# Patient Record
Sex: FEMALE | Race: ASIAN | Employment: OTHER | URBAN - METROPOLITAN AREA
[De-identification: names, ages, dates, MRNs, and addresses within clinical notes are randomized per-mention and may not be internally consistent; named-entity substitution may affect disease eponyms.]

---

## 2017-01-05 ENCOUNTER — OFFICE VISIT (OUTPATIENT)
Dept: INTERNAL MEDICINE CLINIC | Facility: CLINIC | Age: 77
End: 2017-01-05

## 2017-01-05 VITALS
HEART RATE: 80 BPM | WEIGHT: 168 LBS | OXYGEN SATURATION: 98 % | DIASTOLIC BLOOD PRESSURE: 80 MMHG | BODY MASS INDEX: 36.75 KG/M2 | HEIGHT: 56.5 IN | SYSTOLIC BLOOD PRESSURE: 136 MMHG | RESPIRATION RATE: 14 BRPM

## 2017-01-05 DIAGNOSIS — J20.9 ACUTE BRONCHITIS, UNSPECIFIED ORGANISM: Primary | ICD-10-CM

## 2017-01-05 PROCEDURE — 99213 OFFICE O/P EST LOW 20 MIN: CPT | Performed by: INTERNAL MEDICINE

## 2017-01-05 RX ORDER — PREDNISONE 1 MG/1
TABLET ORAL
Qty: 90 TABLET | Refills: 0 | COMMUNITY
Start: 2017-01-05 | End: 2017-04-13 | Stop reason: ALTCHOICE

## 2017-01-05 NOTE — PROGRESS NOTES
Zulay Romo  1940 is a 67year old female who presents for upper respiratory symptoms    Patient presents with:  Medication Follow-Up        HPI:   Much better symptoms resolved    Current Outpatient Prescriptions:  predniSONE 5 MG Oral Tab 10 mg of bone density study 6/12/13   • History of EKG 5/14/13        Smoking Status: Never Smoker                      Smokeless Status: Never Used                        Alcohol Use: No                  REVIEW OF SYSTEMS:   GENERAL: feels well otherwise. Denies

## 2017-02-06 ENCOUNTER — TELEPHONE (OUTPATIENT)
Dept: INTERNAL MEDICINE CLINIC | Facility: CLINIC | Age: 77
End: 2017-02-06

## 2017-02-07 NOTE — TELEPHONE ENCOUNTER
Pt stated that she received an automated phone call. Pt notified to disregard phone call as she was just in the office.

## 2017-02-17 RX ORDER — ALLOPURINOL 100 MG/1
TABLET ORAL
Qty: 90 TABLET | Refills: 0 | Status: SHIPPED | OUTPATIENT
Start: 2017-02-17 | End: 2017-05-17

## 2017-03-09 RX ORDER — LEVOTHYROXINE SODIUM 112 UG/1
TABLET ORAL
Qty: 90 TABLET | Refills: 1 | Status: SHIPPED | OUTPATIENT
Start: 2017-03-09 | End: 2017-09-30

## 2017-03-31 RX ORDER — LOSARTAN POTASSIUM 25 MG/1
TABLET ORAL
Qty: 90 TABLET | Refills: 0 | Status: SHIPPED | OUTPATIENT
Start: 2017-03-31 | End: 2017-05-22

## 2017-04-13 ENCOUNTER — APPOINTMENT (OUTPATIENT)
Dept: LAB | Age: 77
End: 2017-04-13
Attending: INTERNAL MEDICINE
Payer: MEDICARE

## 2017-04-13 ENCOUNTER — OFFICE VISIT (OUTPATIENT)
Dept: INTERNAL MEDICINE CLINIC | Facility: CLINIC | Age: 77
End: 2017-04-13

## 2017-04-13 VITALS
RESPIRATION RATE: 21 BRPM | TEMPERATURE: 97 F | SYSTOLIC BLOOD PRESSURE: 130 MMHG | HEART RATE: 84 BPM | BODY MASS INDEX: 34.12 KG/M2 | OXYGEN SATURATION: 98 % | DIASTOLIC BLOOD PRESSURE: 84 MMHG | HEIGHT: 59 IN | WEIGHT: 169.25 LBS

## 2017-04-13 DIAGNOSIS — E11.65 TYPE 2 DIABETES MELLITUS WITH HYPERGLYCEMIA, WITHOUT LONG-TERM CURRENT USE OF INSULIN (HCC): Primary | Chronic | ICD-10-CM

## 2017-04-13 DIAGNOSIS — E03.4 HYPOTHYROIDISM DUE TO ACQUIRED ATROPHY OF THYROID: Chronic | ICD-10-CM

## 2017-04-13 DIAGNOSIS — E78.00 PURE HYPERCHOLESTEROLEMIA: Chronic | ICD-10-CM

## 2017-04-13 PROCEDURE — 80053 COMPREHEN METABOLIC PANEL: CPT | Performed by: INTERNAL MEDICINE

## 2017-04-13 PROCEDURE — 84443 ASSAY THYROID STIM HORMONE: CPT | Performed by: INTERNAL MEDICINE

## 2017-04-13 PROCEDURE — 36415 COLL VENOUS BLD VENIPUNCTURE: CPT | Performed by: INTERNAL MEDICINE

## 2017-04-13 PROCEDURE — 80061 LIPID PANEL: CPT | Performed by: INTERNAL MEDICINE

## 2017-04-13 PROCEDURE — 83036 HEMOGLOBIN GLYCOSYLATED A1C: CPT | Performed by: INTERNAL MEDICINE

## 2017-04-13 PROCEDURE — 99213 OFFICE O/P EST LOW 20 MIN: CPT | Performed by: INTERNAL MEDICINE

## 2017-04-13 NOTE — PROGRESS NOTES
Sultana Romo  1940 is a 67year old female. Patient presents with: Follow - Up: Diabetes follow up       HPI:   DIABETES MELLITUS:   The diet that the patient has been following is the American Diabetes Association diet.    The frequency of the [facility]    • Pure hypercholesterolemia    • OA (osteoarthritis)    • Hyperuricemia    • Disorder of bursae and tendons in shoulder region    • Rotator cuff (capsule) sprain and strain 6/7/13   • Gouty arthropathy 9/27/12   • Internal hemorr Metabolic Panel (14)    Hypothyroidism due to acquired atrophy of thyroid  -     TSH  -     Comp Metabolic Panel (14)               There are no Patient Instructions on file for this visit.    The patient indicates understanding of these issues and agrees t

## 2017-05-18 RX ORDER — ALLOPURINOL 100 MG/1
TABLET ORAL
Qty: 90 TABLET | Refills: 0 | Status: SHIPPED | OUTPATIENT
Start: 2017-05-18 | End: 2018-08-09

## 2017-05-18 RX ORDER — ALLOPURINOL 100 MG/1
TABLET ORAL
Qty: 90 TABLET | Refills: 0 | Status: SHIPPED | OUTPATIENT
Start: 2017-05-18 | End: 2017-08-24

## 2017-05-22 RX ORDER — LOSARTAN POTASSIUM 25 MG/1
TABLET ORAL
Qty: 90 TABLET | Refills: 0 | Status: SHIPPED | OUTPATIENT
Start: 2017-05-22 | End: 2017-10-24

## 2017-07-13 ENCOUNTER — TELEPHONE (OUTPATIENT)
Dept: INTERNAL MEDICINE CLINIC | Facility: CLINIC | Age: 77
End: 2017-07-13

## 2017-07-13 NOTE — TELEPHONE ENCOUNTER
Patient got to make the call.   We recommend a mammogram however there are studies which recommend otherwise

## 2017-07-13 NOTE — TELEPHONE ENCOUNTER
Pt would like to know if it is still age appropriate for her to have a mammogram done. Says she's heard that it is not necessary after 69.

## 2017-08-23 ENCOUNTER — TELEPHONE (OUTPATIENT)
Dept: INTERNAL MEDICINE CLINIC | Facility: CLINIC | Age: 77
End: 2017-08-23

## 2017-08-23 DIAGNOSIS — Z12.39 SCREENING FOR MALIGNANT NEOPLASM OF BREAST: Primary | ICD-10-CM

## 2017-08-24 DIAGNOSIS — E78.00 PURE HYPERCHOLESTEROLEMIA: Chronic | ICD-10-CM

## 2017-08-24 RX ORDER — ATORVASTATIN CALCIUM 10 MG/1
TABLET, FILM COATED ORAL
Qty: 90 TABLET | Refills: 0 | Status: SHIPPED | OUTPATIENT
Start: 2017-08-24 | End: 2018-06-03

## 2017-08-25 RX ORDER — ALLOPURINOL 100 MG/1
TABLET ORAL
Qty: 90 TABLET | Refills: 0 | Status: SHIPPED | OUTPATIENT
Start: 2017-08-25 | End: 2017-11-06

## 2017-10-02 RX ORDER — LEVOTHYROXINE SODIUM 112 UG/1
TABLET ORAL
Qty: 90 TABLET | Refills: 0 | Status: SHIPPED | OUTPATIENT
Start: 2017-10-02 | End: 2018-01-01

## 2017-10-24 RX ORDER — LOSARTAN POTASSIUM 25 MG/1
TABLET ORAL
Qty: 90 TABLET | Refills: 0 | Status: SHIPPED | OUTPATIENT
Start: 2017-10-24 | End: 2018-02-23

## 2017-11-06 ENCOUNTER — APPOINTMENT (OUTPATIENT)
Dept: LAB | Age: 77
End: 2017-11-06
Attending: INTERNAL MEDICINE
Payer: MEDICARE

## 2017-11-06 ENCOUNTER — OFFICE VISIT (OUTPATIENT)
Dept: INTERNAL MEDICINE CLINIC | Facility: CLINIC | Age: 77
End: 2017-11-06

## 2017-11-06 VITALS
TEMPERATURE: 98 F | RESPIRATION RATE: 19 BRPM | HEART RATE: 78 BPM | HEIGHT: 57 IN | BODY MASS INDEX: 36.57 KG/M2 | WEIGHT: 169.5 LBS | DIASTOLIC BLOOD PRESSURE: 70 MMHG | SYSTOLIC BLOOD PRESSURE: 114 MMHG

## 2017-11-06 DIAGNOSIS — Z00.00 ROUTINE GENERAL MEDICAL EXAMINATION AT A HEALTH CARE FACILITY: Primary | ICD-10-CM

## 2017-11-06 DIAGNOSIS — M10.9 GOUT OF FOOT, UNSPECIFIED CAUSE, UNSPECIFIED CHRONICITY, UNSPECIFIED LATERALITY: Chronic | ICD-10-CM

## 2017-11-06 DIAGNOSIS — E11.65 TYPE 2 DIABETES MELLITUS WITH HYPERGLYCEMIA, WITHOUT LONG-TERM CURRENT USE OF INSULIN (HCC): Chronic | ICD-10-CM

## 2017-11-06 DIAGNOSIS — Z12.31 ENCOUNTER FOR SCREENING MAMMOGRAM FOR BREAST CANCER: ICD-10-CM

## 2017-11-06 DIAGNOSIS — E78.00 PURE HYPERCHOLESTEROLEMIA: Chronic | ICD-10-CM

## 2017-11-06 PROCEDURE — 84436 ASSAY OF TOTAL THYROXINE: CPT | Performed by: INTERNAL MEDICINE

## 2017-11-06 PROCEDURE — 85025 COMPLETE CBC W/AUTO DIFF WBC: CPT | Performed by: INTERNAL MEDICINE

## 2017-11-06 PROCEDURE — 84550 ASSAY OF BLOOD/URIC ACID: CPT | Performed by: INTERNAL MEDICINE

## 2017-11-06 PROCEDURE — 82570 ASSAY OF URINE CREATININE: CPT | Performed by: INTERNAL MEDICINE

## 2017-11-06 PROCEDURE — 82043 UR ALBUMIN QUANTITATIVE: CPT | Performed by: INTERNAL MEDICINE

## 2017-11-06 PROCEDURE — 36415 COLL VENOUS BLD VENIPUNCTURE: CPT | Performed by: INTERNAL MEDICINE

## 2017-11-06 PROCEDURE — G0439 PPPS, SUBSEQ VISIT: HCPCS | Performed by: INTERNAL MEDICINE

## 2017-11-06 PROCEDURE — 84443 ASSAY THYROID STIM HORMONE: CPT | Performed by: INTERNAL MEDICINE

## 2017-11-06 PROCEDURE — 87086 URINE CULTURE/COLONY COUNT: CPT | Performed by: INTERNAL MEDICINE

## 2017-11-06 PROCEDURE — 81003 URINALYSIS AUTO W/O SCOPE: CPT | Performed by: INTERNAL MEDICINE

## 2017-11-06 PROCEDURE — 83036 HEMOGLOBIN GLYCOSYLATED A1C: CPT | Performed by: INTERNAL MEDICINE

## 2017-11-06 PROCEDURE — 80061 LIPID PANEL: CPT | Performed by: INTERNAL MEDICINE

## 2017-11-06 PROCEDURE — 80053 COMPREHEN METABOLIC PANEL: CPT | Performed by: INTERNAL MEDICINE

## 2017-11-06 PROCEDURE — 93000 ELECTROCARDIOGRAM COMPLETE: CPT | Performed by: INTERNAL MEDICINE

## 2017-11-06 NOTE — PROGRESS NOTES
Zulay Romo  1940 is a 65year old female. Patient presents with:   Well Adult: medicare wellness exam       HPI:   Does not  Use cpap     Current Outpatient Prescriptions:  LOSARTAN POTASSIUM 25 MG Oral Tab TAKE 1 TABLET BY MOUTH EVERY DAY Disp tobacco: Never Used                      Alcohol use:  No                 REVIEW OF SYSTEMS:     General/Constitutional:   General able to do usual activities, good exercise tolerance, good general state of health, no fatigue, no fever, no weakness, no weig Dermatologic:   Rash no. Neurologic:   Patient denies dizziness, fainting, loss of consciousness, weakness. Memory loss none. Tingling/numbness none. Trouble with balance none. Psychiatric:   Patient denies depression, hallucinations, memory loss.  An Babinski: negative/all reflexes are normal.   Cerebellar Testing grossly/intact: yes. Cranial Nerves: CN's II-XII grossly intact. Gait: normal.   Mental Status: Alert & oriented x 3.    Motor: power-normal/tone -normal/co-ordination normal/wasting -no

## 2017-11-09 RX ORDER — LOSARTAN POTASSIUM 25 MG/1
TABLET ORAL
Qty: 90 TABLET | Refills: 0 | OUTPATIENT
Start: 2017-11-09

## 2017-11-09 NOTE — TELEPHONE ENCOUNTER
Requesting losartan, metformin  LOV: 11/6/17  RTC: 2 weeks  Last Relevant Labs: 11/2017  Filled: losartan 10/24/17 #90 with 0 refills (refill denied(  Metformin 9/22/17 #60 with 0 refills    No future appointments.

## 2017-11-21 RX ORDER — ALLOPURINOL 100 MG/1
TABLET ORAL
Qty: 90 TABLET | Refills: 0 | Status: SHIPPED | OUTPATIENT
Start: 2017-11-21 | End: 2018-02-13

## 2017-11-21 NOTE — TELEPHONE ENCOUNTER
Medication doesn't fall under protocol. Approve if needed.       Last OV 11/06/17    Last refill 5/18/17, 90 tablets

## 2018-01-02 RX ORDER — LEVOTHYROXINE SODIUM 112 UG/1
TABLET ORAL
Qty: 90 TABLET | Refills: 0 | Status: SHIPPED | OUTPATIENT
Start: 2018-01-02 | End: 2018-04-06

## 2018-02-06 ENCOUNTER — TELEPHONE (OUTPATIENT)
Dept: INTERNAL MEDICINE CLINIC | Facility: CLINIC | Age: 78
End: 2018-02-06

## 2018-02-06 NOTE — TELEPHONE ENCOUNTER
Patient questioned orders for mammogram ordered by doctor.   She was told that at her age \"it doesn't matter if she gets this anymore\"  I informed her doctor must want her to b/c orders are entered from end of last year, she still wanted me to check with

## 2018-02-07 ENCOUNTER — HOSPITAL ENCOUNTER (OUTPATIENT)
Dept: MAMMOGRAPHY | Age: 78
Discharge: HOME | End: 2018-02-07
Attending: INTERNAL MEDICINE
Payer: MEDICARE

## 2018-02-07 DIAGNOSIS — Z12.31 ENCOUNTER FOR SCREENING MAMMOGRAM FOR BREAST CANCER: ICD-10-CM

## 2018-02-07 PROCEDURE — 77067 SCR MAMMO BI INCL CAD: CPT | Performed by: INTERNAL MEDICINE

## 2018-02-13 ENCOUNTER — OFFICE VISIT (OUTPATIENT)
Dept: INTERNAL MEDICINE CLINIC | Facility: CLINIC | Age: 78
End: 2018-02-13

## 2018-02-13 ENCOUNTER — APPOINTMENT (OUTPATIENT)
Dept: LAB | Age: 78
End: 2018-02-13
Attending: INTERNAL MEDICINE
Payer: MEDICARE

## 2018-02-13 VITALS
DIASTOLIC BLOOD PRESSURE: 70 MMHG | WEIGHT: 169 LBS | RESPIRATION RATE: 16 BRPM | OXYGEN SATURATION: 99 % | BODY MASS INDEX: 37 KG/M2 | TEMPERATURE: 98 F | SYSTOLIC BLOOD PRESSURE: 130 MMHG | HEART RATE: 94 BPM

## 2018-02-13 DIAGNOSIS — M65.341 TRIGGER RING FINGER OF RIGHT HAND: ICD-10-CM

## 2018-02-13 DIAGNOSIS — E78.00 PURE HYPERCHOLESTEROLEMIA: Chronic | ICD-10-CM

## 2018-02-13 DIAGNOSIS — E11.65 TYPE 2 DIABETES MELLITUS WITH HYPERGLYCEMIA, WITHOUT LONG-TERM CURRENT USE OF INSULIN (HCC): Primary | Chronic | ICD-10-CM

## 2018-02-13 LAB
ALBUMIN SERPL-MCNC: 3.5 G/DL (ref 3.5–4.8)
ALP LIVER SERPL-CCNC: 70 U/L (ref 55–142)
ALT SERPL-CCNC: 17 U/L (ref 14–54)
AST SERPL-CCNC: 19 U/L (ref 15–41)
BILIRUB SERPL-MCNC: 0.3 MG/DL (ref 0.1–2)
BUN BLD-MCNC: 18 MG/DL (ref 8–20)
CALCIUM BLD-MCNC: 9 MG/DL (ref 8.3–10.3)
CHLORIDE: 106 MMOL/L (ref 101–111)
CHOLEST SMN-MCNC: 162 MG/DL (ref ?–200)
CO2: 29 MMOL/L (ref 22–32)
CREAT BLD-MCNC: 0.76 MG/DL (ref 0.55–1.02)
EST. AVERAGE GLUCOSE BLD GHB EST-MCNC: 140 MG/DL (ref 68–126)
GLUCOSE BLD-MCNC: 94 MG/DL (ref 70–99)
HBA1C MFR BLD HPLC: 6.5 % (ref ?–5.7)
HDLC SERPL-MCNC: 55 MG/DL (ref 45–?)
HDLC SERPL: 2.95 {RATIO} (ref ?–4.44)
LDLC SERPL CALC-MCNC: 68 MG/DL (ref ?–130)
M PROTEIN MFR SERPL ELPH: 7.2 G/DL (ref 6.1–8.3)
NONHDLC SERPL-MCNC: 107 MG/DL (ref ?–130)
POTASSIUM SERPL-SCNC: 4.1 MMOL/L (ref 3.6–5.1)
SODIUM SERPL-SCNC: 142 MMOL/L (ref 136–144)
TRIGL SERPL-MCNC: 193 MG/DL (ref ?–150)
VLDLC SERPL CALC-MCNC: 39 MG/DL (ref 5–40)

## 2018-02-13 PROCEDURE — 83036 HEMOGLOBIN GLYCOSYLATED A1C: CPT | Performed by: INTERNAL MEDICINE

## 2018-02-13 PROCEDURE — 36415 COLL VENOUS BLD VENIPUNCTURE: CPT | Performed by: INTERNAL MEDICINE

## 2018-02-13 PROCEDURE — 99213 OFFICE O/P EST LOW 20 MIN: CPT | Performed by: INTERNAL MEDICINE

## 2018-02-13 PROCEDURE — 80053 COMPREHEN METABOLIC PANEL: CPT | Performed by: INTERNAL MEDICINE

## 2018-02-13 PROCEDURE — 80061 LIPID PANEL: CPT | Performed by: INTERNAL MEDICINE

## 2018-02-13 NOTE — PATIENT INSTRUCTIONS
What is Trigger Finger? Trigger finger is an inflammation of tissue inside your finger or thumb. It is also called tenosynovitis (ten-oh-sin-oh-VY-tis).  Tendons (cordlike fibers that attach muscle to bone and allow you to bend the joints) become swollen © 1314-5152 [address] [facility], [facility] [place]. All rights reserved. This information is not intended as a substitute for professional medical care. Always follow your healthcare professional's instructions.         Wilfrido © 5292-4163 [address]. [address] [facility], [facility] [place]. All rights reserved. This information is not intended as a substitute for professional medical care. Always follow your healthcare professional's instructions.

## 2018-02-13 NOTE — PROGRESS NOTES
Yelena Romo [facility] 1940 is a 65year old female. Patient presents with:   Follow - Up    Patient notes that the right ring finger tends to lock up occasionally  HPI:   DIABETES MELLITUS:   The diet that the patient has been following is the American D • Diabetes mellitus, type II ([facility]    • Disorder of bursae and tendons in shoulder region    • Gouty arthropathy 9/27/12   • History of bone density study 6/12/13   • History of EKG 5/14/13   • Hyperuricemia    • Hypothyroid    • Internal hemorrhoids 4/19/ Type 2 diabetes mellitus with hyperglycemia, without long-term current use of insulin (HCC)  -     HEMOGLOBIN A1C  -     COMP METABOLIC PANEL (14)    Pure hypercholesterolemia  -     LIPID PANEL  -     COMP METABOLIC PANEL (14)    Trigger ring finger of ri The first sign of trigger finger may be pain where the finger or thumb joins the palm. You may also notice some swelling. As the tendon becomes more inflamed, the finger may start to catch when you try to straighten it.  When the locked tendon releases, the If nonsurgical treatments don’t ease your symptoms, surgery may be recommended. A tendon is a cordlike fiber that attaches muscle to bone and allows joints to bend. The tendon is surrounded by a protective cover called a sheath.  During surgery, the sheath

## 2018-02-17 RX ORDER — ALLOPURINOL 100 MG/1
TABLET ORAL
Qty: 90 TABLET | Refills: 0 | Status: SHIPPED | OUTPATIENT
Start: 2018-02-17 | End: 2018-08-09

## 2018-02-20 ENCOUNTER — TELEPHONE (OUTPATIENT)
Dept: INTERNAL MEDICINE CLINIC | Facility: CLINIC | Age: 78
End: 2018-02-20

## 2018-02-20 NOTE — TELEPHONE ENCOUNTER
Patient called back regarding test results. Please call    Patient also stated that she has taken Losartan Potassium in the past and is wondering if she should continue taking this medication since she did not get a refill during her OV.

## 2018-02-23 ENCOUNTER — TELEPHONE (OUTPATIENT)
Dept: INTERNAL MEDICINE CLINIC | Facility: CLINIC | Age: 78
End: 2018-02-23

## 2018-02-23 RX ORDER — LOSARTAN POTASSIUM 25 MG/1
25 TABLET ORAL
Qty: 90 TABLET | Refills: 0 | Status: SHIPPED | OUTPATIENT
Start: 2018-02-23 | End: 2018-06-01

## 2018-02-23 NOTE — TELEPHONE ENCOUNTER
I spoke with  and per VO from , pt is to resume losartan. This medication was being RX for protection of kidneys d/t diabetes. VORB. Prescription sent to pharmacy. LMTCB to inform pt.

## 2018-02-23 NOTE — TELEPHONE ENCOUNTER
See result notes. VM, pt stated that she d/c losartan around 10/2017 and would like to know is she needs to resume medication. FYI:She will be out of town for approximately 1 month.

## 2018-02-23 NOTE — TELEPHONE ENCOUNTER
I am not sure why she d/c medication but she has not been taking since 10/2017. Also BP readings at OV 11/2017 (114/70) and 2/13/18 (130/70) was without medication. Please advice. TY!

## 2018-02-23 NOTE — TELEPHONE ENCOUNTER
Patient will like a refill of Losartan sent to [facility] for 1 month

## 2018-03-26 ENCOUNTER — TELEPHONE (OUTPATIENT)
Dept: INTERNAL MEDICINE CLINIC | Facility: CLINIC | Age: 78
End: 2018-03-26

## 2018-03-26 NOTE — TELEPHONE ENCOUNTER
Patient called to speak with nurse regarding pain in fingers on right hand.  Patient states fingers get stuck together

## 2018-03-27 NOTE — TELEPHONE ENCOUNTER
Pt stated that her fingers are still getting stuck together and painful. She was provided with ortho contact info and advised to call for eval/treatment. Understanding verbalized.

## 2018-03-29 ENCOUNTER — TELEPHONE (OUTPATIENT)
Dept: INTERNAL MEDICINE CLINIC | Facility: CLINIC | Age: 78
End: 2018-03-29

## 2018-03-29 NOTE — TELEPHONE ENCOUNTER
Patient was called and notified that her forms are ready for .  Copy left at  and in purple folder and sent for scanning

## 2018-04-09 RX ORDER — LEVOTHYROXINE SODIUM 112 UG/1
TABLET ORAL
Qty: 90 TABLET | Refills: 0 | Status: SHIPPED | OUTPATIENT
Start: 2018-04-09 | End: 2018-08-24

## 2018-04-09 NOTE — TELEPHONE ENCOUNTER
Medication(s) to Refill:   Pending Prescriptions Disp Refills    LEVOTHYROXINE SODIUM 112 MCG Oral Tab [Pharmacy Med Name: LEVOTHYROXINE 0.112MG (112MCG) TABS] 90 tablet 0     Sig: TAKE 1 TABLET BY MOUTH DAILY BEFORE BREAKFAST             Reason for Medica

## 2018-04-16 PROBLEM — M65.341 TRIGGER RING FINGER OF RIGHT HAND: Status: ACTIVE | Noted: 2018-04-16

## 2018-06-01 RX ORDER — LOSARTAN POTASSIUM 25 MG/1
TABLET ORAL
Qty: 90 TABLET | Refills: 0 | Status: SHIPPED | OUTPATIENT
Start: 2018-06-01 | End: 2018-08-29

## 2018-06-01 NOTE — TELEPHONE ENCOUNTER
Losartan Potassium 25 MG Oral Tab 90 tablet 0 2/23/2018    Sig :  Take 1 tablet (25 mg total) by mouth once daily.

## 2018-06-03 DIAGNOSIS — E78.00 PURE HYPERCHOLESTEROLEMIA: Chronic | ICD-10-CM

## 2018-06-05 RX ORDER — ATORVASTATIN CALCIUM 10 MG/1
TABLET, FILM COATED ORAL
Qty: 90 TABLET | Refills: 0 | Status: SHIPPED | OUTPATIENT
Start: 2018-06-05 | End: 2018-09-21

## 2018-06-05 NOTE — TELEPHONE ENCOUNTER
ATORVASTATIN 10 MG Oral Tab 90 tablet 0 8/24/2017    Sig :  TAKE 1 TABLET(10 MG) BY MOUTH EVERY NIGHT

## 2018-08-09 ENCOUNTER — APPOINTMENT (OUTPATIENT)
Dept: LAB | Age: 78
End: 2018-08-09
Attending: INTERNAL MEDICINE
Payer: MEDICARE

## 2018-08-09 ENCOUNTER — OFFICE VISIT (OUTPATIENT)
Dept: INTERNAL MEDICINE CLINIC | Facility: CLINIC | Age: 78
End: 2018-08-09
Payer: MEDICARE

## 2018-08-09 VITALS
DIASTOLIC BLOOD PRESSURE: 80 MMHG | WEIGHT: 176.5 LBS | BODY MASS INDEX: 38.08 KG/M2 | HEIGHT: 57 IN | RESPIRATION RATE: 16 BRPM | HEART RATE: 107 BPM | TEMPERATURE: 98 F | OXYGEN SATURATION: 97 % | SYSTOLIC BLOOD PRESSURE: 120 MMHG

## 2018-08-09 DIAGNOSIS — E11.65 TYPE 2 DIABETES MELLITUS WITH HYPERGLYCEMIA, WITHOUT LONG-TERM CURRENT USE OF INSULIN (HCC): Chronic | ICD-10-CM

## 2018-08-09 DIAGNOSIS — E78.00 PURE HYPERCHOLESTEROLEMIA: Chronic | ICD-10-CM

## 2018-08-09 DIAGNOSIS — M10.9 GOUT OF FOOT, UNSPECIFIED CAUSE, UNSPECIFIED CHRONICITY, UNSPECIFIED LATERALITY: Chronic | ICD-10-CM

## 2018-08-09 DIAGNOSIS — E03.4 HYPOTHYROIDISM DUE TO ACQUIRED ATROPHY OF THYROID: Primary | Chronic | ICD-10-CM

## 2018-08-09 LAB
ALBUMIN SERPL-MCNC: 3.6 G/DL (ref 3.5–4.8)
ALBUMIN/GLOB SERPL: 1.1 {RATIO} (ref 1–2)
ALP LIVER SERPL-CCNC: 63 U/L (ref 55–142)
ALT SERPL-CCNC: 29 U/L (ref 14–54)
ANION GAP SERPL CALC-SCNC: 6 MMOL/L (ref 0–18)
AST SERPL-CCNC: 22 U/L (ref 15–41)
BILIRUB SERPL-MCNC: 0.3 MG/DL (ref 0.1–2)
BUN BLD-MCNC: 12 MG/DL (ref 8–20)
BUN/CREAT SERPL: 13.8 (ref 10–20)
CALCIUM BLD-MCNC: 8.8 MG/DL (ref 8.3–10.3)
CHLORIDE SERPL-SCNC: 109 MMOL/L (ref 101–111)
CHOLEST SMN-MCNC: 148 MG/DL (ref ?–200)
CO2 SERPL-SCNC: 27 MMOL/L (ref 22–32)
CREAT BLD-MCNC: 0.87 MG/DL (ref 0.55–1.02)
EST. AVERAGE GLUCOSE BLD GHB EST-MCNC: 137 MG/DL (ref 68–126)
GLOBULIN PLAS-MCNC: 3.4 G/DL (ref 2.5–3.7)
GLUCOSE BLD-MCNC: 92 MG/DL (ref 70–99)
HBA1C MFR BLD HPLC: 6.4 % (ref ?–5.7)
HDLC SERPL-MCNC: 47 MG/DL (ref 40–59)
LDLC SERPL CALC-MCNC: 68 MG/DL (ref ?–100)
M PROTEIN MFR SERPL ELPH: 7 G/DL (ref 6.1–8.3)
NONHDLC SERPL-MCNC: 101 MG/DL (ref ?–130)
OSMOLALITY SERPL CALC.SUM OF ELEC: 293 MOSM/KG (ref 275–295)
POTASSIUM SERPL-SCNC: 4.2 MMOL/L (ref 3.6–5.1)
SODIUM SERPL-SCNC: 142 MMOL/L (ref 136–144)
T4 SERPL-MCNC: 10.7 UG/DL (ref 4.5–10.9)
TRIGL SERPL-MCNC: 163 MG/DL (ref 30–149)
TSI SER-ACNC: 0.51 MIU/ML (ref 0.35–5.5)
VLDLC SERPL CALC-MCNC: 33 MG/DL (ref 0–30)

## 2018-08-09 PROCEDURE — 80061 LIPID PANEL: CPT | Performed by: INTERNAL MEDICINE

## 2018-08-09 PROCEDURE — 80053 COMPREHEN METABOLIC PANEL: CPT | Performed by: INTERNAL MEDICINE

## 2018-08-09 PROCEDURE — 84436 ASSAY OF TOTAL THYROXINE: CPT | Performed by: INTERNAL MEDICINE

## 2018-08-09 PROCEDURE — 36415 COLL VENOUS BLD VENIPUNCTURE: CPT | Performed by: INTERNAL MEDICINE

## 2018-08-09 PROCEDURE — 83036 HEMOGLOBIN GLYCOSYLATED A1C: CPT | Performed by: INTERNAL MEDICINE

## 2018-08-09 PROCEDURE — 84443 ASSAY THYROID STIM HORMONE: CPT | Performed by: INTERNAL MEDICINE

## 2018-08-09 PROCEDURE — 99213 OFFICE O/P EST LOW 20 MIN: CPT | Performed by: INTERNAL MEDICINE

## 2018-08-09 RX ORDER — ALLOPURINOL 100 MG/1
TABLET ORAL
Qty: 90 TABLET | Refills: 0 | Status: SHIPPED | OUTPATIENT
Start: 2018-08-09 | End: 2018-12-06

## 2018-08-09 NOTE — PROGRESS NOTES
Dillon Baez Thiagoar  1940 is a 77year old female. Patient presents with:   Follow - Up    Patient notes that the right ring finger tends to lock up occasionally  HPI:   DIABETES MELLITUS:   The diet that the patient has been following is the American D [facility])    • Disorder of bursae and tendons in shoulder region    • Gouty arthropathy 9/27/12   • History of bone density study 6/12/13   • History of EKG 5/14/13   • Hyperuricemia    • Hypothyroid    • Internal hemorrhoids 4/19/10   • OA (osteoarthritis) TOTAL)    Type 2 diabetes mellitus with hyperglycemia, without long-term current use of insulin (HCC)  -     HEMOGLOBIN A1C  -     COMP METABOLIC PANEL (14)    Pure hypercholesterolemia  -     LIPID PANEL  -     COMP METABOLIC PANEL (14)    Gout of foot, u

## 2018-08-24 RX ORDER — LEVOTHYROXINE SODIUM 112 UG/1
TABLET ORAL
Qty: 90 TABLET | Refills: 0 | Status: SHIPPED | OUTPATIENT
Start: 2018-08-24 | End: 2018-12-06

## 2018-08-24 NOTE — TELEPHONE ENCOUNTER
Protocol passed      Medication(s) to Refill:   Pending Prescriptions Disp Refills    LEVOTHYROXINE SODIUM 112 MCG Oral Tab [Pharmacy Med Name: LEVOTHYROXINE 0.112MG (112MCG) TABS] 90 tablet 0     Sig: TAKE 1 TABLET BY MOUTH DAILY BEFORE BREAKFAST

## 2018-08-27 NOTE — TELEPHONE ENCOUNTER
Medication(s) to Refill:   Pending Prescriptions Disp Refills    METFORMIN  MG Oral Tab [Pharmacy Med Name: METFORMIN 500MG TABLETS] 180 tablet 0     Sig: TAKE 1 TABLET(500 MG) BY MOUTH TWICE DAILY           Last Time Medication was Filled:  4/02/20 eAG is the estimated average glucose calculated from Hgb A1c according to the formula recommended by the American Diabetes Association.  eAG levels reflect the long term average glucose and may not correlate with random or fasting glucose levels since these

## 2018-08-29 RX ORDER — LOSARTAN POTASSIUM 25 MG/1
TABLET ORAL
Qty: 90 TABLET | Refills: 0 | Status: SHIPPED | OUTPATIENT
Start: 2018-08-29 | End: 2018-12-06

## 2018-09-21 DIAGNOSIS — E78.00 PURE HYPERCHOLESTEROLEMIA: Chronic | ICD-10-CM

## 2018-09-24 RX ORDER — ATORVASTATIN CALCIUM 10 MG/1
TABLET, FILM COATED ORAL
Qty: 90 TABLET | Refills: 0 | Status: SHIPPED | OUTPATIENT
Start: 2018-09-24 | End: 2018-12-06

## 2018-09-24 NOTE — TELEPHONE ENCOUNTER
Medication(s) to Refill:   Requested Prescriptions     Pending Prescriptions Disp Refills   • ATORVASTATIN 10 MG Oral Tab [Pharmacy Med Name: ATORVASTATIN 10MG TABLETS] 90 tablet 0     Sig: TAKE 1 TABLET(10 MG) BY MOUTH EVERY NIGHT       Last Time Sofia Kimbrough

## 2018-11-08 ENCOUNTER — APPOINTMENT (OUTPATIENT)
Dept: LAB | Age: 78
End: 2018-11-08
Attending: INTERNAL MEDICINE
Payer: MEDICARE

## 2018-11-08 ENCOUNTER — OFFICE VISIT (OUTPATIENT)
Dept: INTERNAL MEDICINE CLINIC | Facility: CLINIC | Age: 78
End: 2018-11-08
Payer: MEDICARE

## 2018-11-08 VITALS
BODY MASS INDEX: 37.48 KG/M2 | WEIGHT: 173.75 LBS | HEART RATE: 100 BPM | SYSTOLIC BLOOD PRESSURE: 122 MMHG | OXYGEN SATURATION: 98 % | HEIGHT: 57 IN | DIASTOLIC BLOOD PRESSURE: 84 MMHG | TEMPERATURE: 98 F | RESPIRATION RATE: 20 BRPM

## 2018-11-08 DIAGNOSIS — E03.4 HYPOTHYROIDISM DUE TO ACQUIRED ATROPHY OF THYROID: Chronic | ICD-10-CM

## 2018-11-08 DIAGNOSIS — M10.9 GOUT OF FOOT, UNSPECIFIED CAUSE, UNSPECIFIED CHRONICITY, UNSPECIFIED LATERALITY: Chronic | ICD-10-CM

## 2018-11-08 DIAGNOSIS — E11.65 TYPE 2 DIABETES MELLITUS WITH HYPERGLYCEMIA, WITHOUT LONG-TERM CURRENT USE OF INSULIN (HCC): Chronic | ICD-10-CM

## 2018-11-08 DIAGNOSIS — Z78.0 ASYMPTOMATIC MENOPAUSAL STATE: ICD-10-CM

## 2018-11-08 DIAGNOSIS — Z00.00 ROUTINE GENERAL MEDICAL EXAMINATION AT A HEALTH CARE FACILITY: Primary | ICD-10-CM

## 2018-11-08 PROCEDURE — 84443 ASSAY THYROID STIM HORMONE: CPT | Performed by: INTERNAL MEDICINE

## 2018-11-08 PROCEDURE — 80053 COMPREHEN METABOLIC PANEL: CPT | Performed by: INTERNAL MEDICINE

## 2018-11-08 PROCEDURE — 82570 ASSAY OF URINE CREATININE: CPT | Performed by: INTERNAL MEDICINE

## 2018-11-08 PROCEDURE — 81003 URINALYSIS AUTO W/O SCOPE: CPT | Performed by: INTERNAL MEDICINE

## 2018-11-08 PROCEDURE — 83036 HEMOGLOBIN GLYCOSYLATED A1C: CPT | Performed by: INTERNAL MEDICINE

## 2018-11-08 PROCEDURE — 84550 ASSAY OF BLOOD/URIC ACID: CPT | Performed by: INTERNAL MEDICINE

## 2018-11-08 PROCEDURE — 80061 LIPID PANEL: CPT | Performed by: INTERNAL MEDICINE

## 2018-11-08 PROCEDURE — 36415 COLL VENOUS BLD VENIPUNCTURE: CPT | Performed by: INTERNAL MEDICINE

## 2018-11-08 PROCEDURE — 84436 ASSAY OF TOTAL THYROXINE: CPT | Performed by: INTERNAL MEDICINE

## 2018-11-08 PROCEDURE — 90670 PCV13 VACCINE IM: CPT | Performed by: INTERNAL MEDICINE

## 2018-11-08 PROCEDURE — G0439 PPPS, SUBSEQ VISIT: HCPCS | Performed by: INTERNAL MEDICINE

## 2018-11-08 PROCEDURE — 82043 UR ALBUMIN QUANTITATIVE: CPT | Performed by: INTERNAL MEDICINE

## 2018-11-08 PROCEDURE — 85025 COMPLETE CBC W/AUTO DIFF WBC: CPT | Performed by: INTERNAL MEDICINE

## 2018-11-08 PROCEDURE — G0009 ADMIN PNEUMOCOCCAL VACCINE: HCPCS | Performed by: INTERNAL MEDICINE

## 2018-11-08 NOTE — PROGRESS NOTES
Roro Romo  1940 is a 77year old female.     Patient presents with:  Physical      HPI:   Does not  Use cpap     Current Outpatient Medications:  ATORVASTATIN 10 MG Oral Tab TAKE 1 TABLET(10 MG) BY MOUTH EVERY NIGHT Disp: 90 tablet Rfl: Daiana CARDENAS use: No       REVIEW OF SYSTEMS:     General/Constitutional:   General able to do usual activities, good exercise tolerance, good general state of health, no fatigue, no fever, no weakness, no weight loss or gain .    HEENT/Neck:   Head no headache, no dizz fainting, loss of consciousness, weakness. Memory loss none. Tingling/numbness none. Trouble with balance none. Psychiatric:   Patient denies depression, hallucinations, memory loss. Anxiety none. Insomnia none.                 EXAM:   /84   Pulse 1 intact. Gait: normal.   Mental Status: Alert & oriented x 3. Motor: power-normal/tone -normal/co-ordination normal/wasting -none/involuntary movements -none. Reflexes: normal.   Sensory: normal sensation to all modalities.    LYMPHATICS:   Groin: no a

## 2018-11-10 ENCOUNTER — HOSPITAL ENCOUNTER (OUTPATIENT)
Dept: BONE DENSITY | Age: 78
Discharge: HOME | End: 2018-11-10
Attending: INTERNAL MEDICINE
Payer: MEDICARE

## 2018-11-10 DIAGNOSIS — Z78.0 ASYMPTOMATIC MENOPAUSAL STATE: ICD-10-CM

## 2018-11-10 DIAGNOSIS — Z00.00 ROUTINE GENERAL MEDICAL EXAMINATION AT A HEALTH CARE FACILITY: ICD-10-CM

## 2018-11-10 PROCEDURE — 77080 DXA BONE DENSITY AXIAL: CPT | Performed by: INTERNAL MEDICINE

## 2018-12-06 DIAGNOSIS — E78.00 PURE HYPERCHOLESTEROLEMIA: Chronic | ICD-10-CM

## 2018-12-06 DIAGNOSIS — M10.9 GOUT OF FOOT, UNSPECIFIED CAUSE, UNSPECIFIED CHRONICITY, UNSPECIFIED LATERALITY: Chronic | ICD-10-CM

## 2018-12-07 RX ORDER — ATORVASTATIN CALCIUM 10 MG/1
TABLET, FILM COATED ORAL
Qty: 90 TABLET | Refills: 0 | Status: SHIPPED | OUTPATIENT
Start: 2018-12-07 | End: 2019-03-29

## 2018-12-07 RX ORDER — LEVOTHYROXINE SODIUM 112 UG/1
TABLET ORAL
Qty: 90 TABLET | Refills: 0 | Status: SHIPPED | OUTPATIENT
Start: 2018-12-07 | End: 2019-03-29

## 2018-12-07 RX ORDER — ALLOPURINOL 100 MG/1
TABLET ORAL
Qty: 90 TABLET | Refills: 0 | Status: SHIPPED | OUTPATIENT
Start: 2018-12-07 | End: 2019-03-29

## 2018-12-07 RX ORDER — LOSARTAN POTASSIUM 25 MG/1
TABLET ORAL
Qty: 90 TABLET | Refills: 0 | Status: SHIPPED | OUTPATIENT
Start: 2018-12-07 | End: 2019-04-03

## 2018-12-07 NOTE — TELEPHONE ENCOUNTER
Phillip Patient - Protocol passed    Medication(s) to Refill:   Requested Prescriptions     Pending Prescriptions Disp Refills   • METFORMIN  MG Oral Tab [Pharmacy Med Name: METFORMIN 500MG TABLETS] 180 tablet 0     Sig: TAKE 1 TABLET(500 MG) BY SANTOS (H) 11/08/2018    A1C 6.4 (H) 11/08/2018     Lab Results   Component Value Date    MALBP 2.03 11/08/2018    CREUR 272.00 11/08/2018    MALBCRECALC 14.3 06/23/2012    MICROALBUMIN 11.0 10/27/2016    CREAUR 182 10/30/2015    MALBCREACALC 5 10/30/2015     Lab

## 2019-03-05 ENCOUNTER — OFFICE VISIT (OUTPATIENT)
Dept: INTERNAL MEDICINE CLINIC | Facility: CLINIC | Age: 79
End: 2019-03-05
Payer: MEDICARE

## 2019-03-05 VITALS
DIASTOLIC BLOOD PRESSURE: 86 MMHG | HEART RATE: 98 BPM | SYSTOLIC BLOOD PRESSURE: 128 MMHG | RESPIRATION RATE: 16 BRPM | HEIGHT: 57 IN | BODY MASS INDEX: 37.59 KG/M2 | TEMPERATURE: 98 F | OXYGEN SATURATION: 96 % | WEIGHT: 174.25 LBS

## 2019-03-05 DIAGNOSIS — E11.65 TYPE 2 DIABETES MELLITUS WITH HYPERGLYCEMIA, WITHOUT LONG-TERM CURRENT USE OF INSULIN (HCC): Primary | ICD-10-CM

## 2019-03-05 DIAGNOSIS — E03.4 HYPOTHYROIDISM DUE TO ACQUIRED ATROPHY OF THYROID: ICD-10-CM

## 2019-03-05 DIAGNOSIS — E78.00 PURE HYPERCHOLESTEROLEMIA: ICD-10-CM

## 2019-03-05 PROCEDURE — 99213 OFFICE O/P EST LOW 20 MIN: CPT | Performed by: INTERNAL MEDICINE

## 2019-03-05 NOTE — PROGRESS NOTES
Nimisha Romo [facility] 1940 is a 77year old female.     Patient presents with:  Medication Follow-Up  Received phone call for automated follow-up    HPI:   DIABETES MELLITUS:   The diet that the patient has been following is the American Diabetes Associatio of bursae and tendons in shoulder region    • Gouty arthropathy 9/27/12   • History of bone density study 6/12/13   • History of EKG 5/14/13   • Hyperuricemia    • Hypothyroid    • Internal hemorrhoids 4/19/10   • OA (osteoarthritis)    • MIGUEL (obstructive PANEL  -     COMP METABOLIC PANEL (14)    Hypothyroidism due to acquired atrophy of thyroid  -     ASSAY, THYROID STIM HORMONE               Patient Instructions   Blood work prior to recommendations     The patient indicates understanding of these issues

## 2019-03-29 DIAGNOSIS — M10.9 GOUT OF FOOT, UNSPECIFIED CAUSE, UNSPECIFIED CHRONICITY, UNSPECIFIED LATERALITY: Chronic | ICD-10-CM

## 2019-03-29 DIAGNOSIS — E78.00 PURE HYPERCHOLESTEROLEMIA: Chronic | ICD-10-CM

## 2019-03-29 RX ORDER — ATORVASTATIN CALCIUM 10 MG/1
TABLET, FILM COATED ORAL
Qty: 90 TABLET | Refills: 0 | Status: SHIPPED | OUTPATIENT
Start: 2019-03-29 | End: 2019-06-29

## 2019-03-29 RX ORDER — ALLOPURINOL 100 MG/1
TABLET ORAL
Qty: 90 TABLET | Refills: 1 | Status: SHIPPED | OUTPATIENT
Start: 2019-03-29 | End: 2019-09-24

## 2019-03-29 RX ORDER — LEVOTHYROXINE SODIUM 112 UG/1
TABLET ORAL
Qty: 90 TABLET | Refills: 0 | Status: SHIPPED | OUTPATIENT
Start: 2019-03-29 | End: 2019-06-29

## 2019-03-29 NOTE — TELEPHONE ENCOUNTER
Last OV: 3/5/19 with Dr. Ish Falk  Last refill date: 12/7/18     #/refills: #90, 0 refills  When pt was asked to return for OV: 6 months  Upcoming appt/reason: no upcoming appt  Last labs 11/8/18

## 2019-03-29 NOTE — TELEPHONE ENCOUNTER
Levothyroxine 112 mcg 1 tab daily filled 12-7-18 90 with 0 refills     Atorvastatin 10 mg 1 tab nightly filled 12-7-18 90 with 0 refills     Metformin 500 mg 1 tab bid filled 12-7-18 180 with 0 refills     LOV 3-5-19     Return in about 6 months (around 9/

## 2019-04-04 RX ORDER — LOSARTAN POTASSIUM 25 MG/1
TABLET ORAL
Qty: 90 TABLET | Refills: 1 | Status: SHIPPED | OUTPATIENT
Start: 2019-04-04 | End: 2019-11-07

## 2019-04-04 NOTE — TELEPHONE ENCOUNTER
Refill requested:   Requested Prescriptions     Pending Prescriptions Disp Refills   • LOSARTAN POTASSIUM 25 MG Oral Tab [Pharmacy Med Name: LOSARTAN 25MG TABLETS] 90 tablet 0     Sig: TAKE 1 TABLET(25 MG) BY MOUTH EVERY DAY       Passed protocol-enough re

## 2019-06-19 ENCOUNTER — APPOINTMENT (OUTPATIENT)
Dept: GENERAL RADIOLOGY | Facility: HOSPITAL | Age: 79
End: 2019-06-19
Attending: PHYSICIAN ASSISTANT
Payer: MEDICARE

## 2019-06-19 ENCOUNTER — HOSPITAL ENCOUNTER (EMERGENCY)
Facility: HOSPITAL | Age: 79
Discharge: HOME | End: 2019-06-19
Attending: EMERGENCY MEDICINE
Payer: MEDICARE

## 2019-06-19 VITALS
SYSTOLIC BLOOD PRESSURE: 122 MMHG | RESPIRATION RATE: 18 BRPM | BODY MASS INDEX: 36 KG/M2 | HEIGHT: 58 IN | TEMPERATURE: 99 F | OXYGEN SATURATION: 97 % | DIASTOLIC BLOOD PRESSURE: 66 MMHG | HEART RATE: 80 BPM

## 2019-06-19 DIAGNOSIS — L03.115 CELLULITIS OF RIGHT LOWER EXTREMITY: ICD-10-CM

## 2019-06-19 DIAGNOSIS — S90.851A FOREIGN BODY IN RIGHT FOOT, INITIAL ENCOUNTER: Primary | ICD-10-CM

## 2019-06-19 PROCEDURE — 99283 EMERGENCY DEPT VISIT LOW MDM: CPT

## 2019-06-19 PROCEDURE — 73660 X-RAY EXAM OF TOE(S): CPT | Performed by: PHYSICIAN ASSISTANT

## 2019-06-19 PROCEDURE — 99284 EMERGENCY DEPT VISIT MOD MDM: CPT

## 2019-06-19 PROCEDURE — 90471 IMMUNIZATION ADMIN: CPT

## 2019-06-19 RX ORDER — CEPHALEXIN 500 MG/1
500 CAPSULE ORAL 4 TIMES DAILY
Qty: 28 CAPSULE | Refills: 0 | Status: SHIPPED | OUTPATIENT
Start: 2019-06-19 | End: 2019-06-26

## 2019-06-19 NOTE — ED INITIAL ASSESSMENT (HPI)
Patient with c/o right fifth digit toe pain after stubbing it on a floor threshold two days ago. Redness and swelling noted to top of right foot.

## 2019-06-19 NOTE — ED PROVIDER NOTES
Patient Seen in: [facility] Emergency Department    History   Patient presents with:  Lower Extremity Injury (musculoskeletal)    Stated Complaint: right toe pain.  Injured on door threshold     HPI    39-year-old female who comes in today stating th Neck: Normal range of motion. Cardiovascular: Normal rate, regular rhythm, normal heart sounds and intact distal pulses. Pulmonary/Chest: Effort normal and breath sounds normal. No respiratory distress. She has no wheezes. She has no rales.    [address] PROCEDURE:  XR TOE(S) (MIN 2 VIEWS), RIGHT 5TH (CPT=73660)  TECHNIQUE:  Three views were obtained. COMPARISON:  None. INDICATIONS:  right toe pain.  Injured on door threshold  PATIENT STATED HISTORY: (As transcribed by Technologist)  Patient states she in The patient is in good condition throughout her visit today and remains so upon discharge.  I discuss the plan of care with the patient, who expresses understanding.  All questions and concerns are addressed to the patient's satisfaction prior to discharge

## 2019-06-21 ENCOUNTER — LAB ENCOUNTER (OUTPATIENT)
Dept: LAB | Age: 79
End: 2019-06-21
Attending: PODIATRIST
Payer: COMMERCIAL

## 2019-06-21 DIAGNOSIS — L02.619 CELLULITIS AND ABSCESS OF FOOT: ICD-10-CM

## 2019-06-21 DIAGNOSIS — L03.119 CELLULITIS AND ABSCESS OF FOOT: ICD-10-CM

## 2019-06-21 DIAGNOSIS — S90.851A FOREIGN BODY IN RIGHT FOOT, INITIAL ENCOUNTER: ICD-10-CM

## 2019-06-21 PROCEDURE — 87205 SMEAR GRAM STAIN: CPT

## 2019-06-21 PROCEDURE — 87070 CULTURE OTHR SPECIMN AEROBIC: CPT

## 2019-06-21 PROCEDURE — 87186 SC STD MICRODIL/AGAR DIL: CPT

## 2019-06-21 PROCEDURE — 87077 CULTURE AEROBIC IDENTIFY: CPT

## 2019-06-24 ENCOUNTER — OFFICE VISIT (OUTPATIENT)
Dept: INTERNAL MEDICINE CLINIC | Facility: CLINIC | Age: 79
End: 2019-06-24
Payer: MEDICARE

## 2019-06-24 ENCOUNTER — APPOINTMENT (OUTPATIENT)
Dept: LAB | Age: 79
End: 2019-06-24
Attending: INTERNAL MEDICINE
Payer: MEDICARE

## 2019-06-24 VITALS
TEMPERATURE: 98 F | WEIGHT: 171.5 LBS | BODY MASS INDEX: 37 KG/M2 | DIASTOLIC BLOOD PRESSURE: 74 MMHG | OXYGEN SATURATION: 98 % | RESPIRATION RATE: 20 BRPM | SYSTOLIC BLOOD PRESSURE: 116 MMHG | HEART RATE: 100 BPM | HEIGHT: 57 IN

## 2019-06-24 DIAGNOSIS — E78.00 PURE HYPERCHOLESTEROLEMIA: ICD-10-CM

## 2019-06-24 DIAGNOSIS — E11.65 TYPE 2 DIABETES MELLITUS WITH HYPERGLYCEMIA, WITHOUT LONG-TERM CURRENT USE OF INSULIN (HCC): Primary | ICD-10-CM

## 2019-06-24 PROCEDURE — 99213 OFFICE O/P EST LOW 20 MIN: CPT | Performed by: INTERNAL MEDICINE

## 2019-06-24 PROCEDURE — 36415 COLL VENOUS BLD VENIPUNCTURE: CPT | Performed by: INTERNAL MEDICINE

## 2019-06-24 PROCEDURE — 84443 ASSAY THYROID STIM HORMONE: CPT | Performed by: INTERNAL MEDICINE

## 2019-06-24 PROCEDURE — 80061 LIPID PANEL: CPT | Performed by: INTERNAL MEDICINE

## 2019-06-24 PROCEDURE — 80053 COMPREHEN METABOLIC PANEL: CPT | Performed by: INTERNAL MEDICINE

## 2019-06-24 PROCEDURE — 83036 HEMOGLOBIN GLYCOSYLATED A1C: CPT | Performed by: INTERNAL MEDICINE

## 2019-06-24 NOTE — PROGRESS NOTES
Maday Romo  1940 is a 77year old female. Patient presents with:  Diabetes      HPI:   DIABETES MELLITUS:   The diet that the patient has been following is the American Diabetes Association diet.    The frequency of the monitoring schedule is o Diagnosis Date   • Diabetes mellitus, type II ([facility]    • Disorder of bursae and tendons in shoulder region    • Gouty arthropathy 9/27/12   • History of bone density study 6/12/13   • History of EKG 5/14/13   • Hyperuricemia    • Hypothyroid    • Interna Patient Instructions   To proceed with blood work. Her recent visit to the podiatrist has been reviewed. Recommendations once blood work is available     The patient indicates understanding of these issues and agrees to the plan.   The patient i

## 2019-06-24 NOTE — PATIENT INSTRUCTIONS
To proceed with blood work. Her recent visit to the podiatrist has been reviewed.   Recommendations once blood work is available

## 2019-06-28 PROBLEM — Z47.89 AFTERCARE FOLLOWING SURGERY OF THE MUSCULOSKELETAL SYSTEM, NEC: Status: ACTIVE | Noted: 2019-06-28

## 2019-06-29 DIAGNOSIS — E78.00 PURE HYPERCHOLESTEROLEMIA: Chronic | ICD-10-CM

## 2019-07-01 RX ORDER — LEVOTHYROXINE SODIUM 112 UG/1
TABLET ORAL
Qty: 90 TABLET | Refills: 0 | Status: SHIPPED | OUTPATIENT
Start: 2019-07-01 | End: 2019-09-26

## 2019-07-01 RX ORDER — ATORVASTATIN CALCIUM 10 MG/1
TABLET, FILM COATED ORAL
Qty: 90 TABLET | Refills: 0 | Status: SHIPPED | OUTPATIENT
Start: 2019-07-01 | End: 2019-09-26

## 2019-07-01 NOTE — TELEPHONE ENCOUNTER
Protocol passed.      Medication(s) to Refill:   Requested Prescriptions     Pending Prescriptions Disp Refills   • LEVOTHYROXINE SODIUM 112 MCG Oral Tab [Pharmacy Med Name: LEVOTHYROXINE 0.112MG (112MCG) TABS] 90 tablet 0     Sig: TAKE 1 TABLET BY MOUTH DA 06/24/2019    TCHDELIEATIO 2.95 02/13/2018    [place] 67 06/24/2019    CHOLLYLA 5.5 (H) 10/27/2016

## 2019-09-24 DIAGNOSIS — M10.9 GOUT OF FOOT, UNSPECIFIED CAUSE, UNSPECIFIED CHRONICITY, UNSPECIFIED LATERALITY: Chronic | ICD-10-CM

## 2019-09-24 RX ORDER — ALLOPURINOL 100 MG/1
TABLET ORAL
Qty: 90 TABLET | Refills: 1 | Status: SHIPPED | OUTPATIENT
Start: 2019-09-24 | End: 2020-03-26

## 2019-09-24 NOTE — TELEPHONE ENCOUNTER
Failed protocol     Last refill:  3/29/2019 Allopurinol 100 mg #90 1R    LOV:   6/24/2019 Dr Komal Mcintyre RTC 6 months    No FOV scheduled

## 2019-09-26 DIAGNOSIS — E78.00 PURE HYPERCHOLESTEROLEMIA: Chronic | ICD-10-CM

## 2019-09-26 RX ORDER — ATORVASTATIN CALCIUM 10 MG/1
TABLET, FILM COATED ORAL
Qty: 90 TABLET | Refills: 0 | Status: SHIPPED | OUTPATIENT
Start: 2019-09-26 | End: 2019-12-31

## 2019-09-26 RX ORDER — LEVOTHYROXINE SODIUM 112 UG/1
TABLET ORAL
Qty: 90 TABLET | Refills: 0 | Status: SHIPPED | OUTPATIENT
Start: 2019-09-26 | End: 2019-12-31

## 2019-09-26 NOTE — TELEPHONE ENCOUNTER
Passed protocol    Medication(s) to Refill:   Requested Prescriptions     Pending Prescriptions Disp Refills   • ATORVASTATIN 10 MG Oral Tab [Pharmacy Med Name: ATORVASTATIN 10MG TABLETS] 90 tablet 0     Sig: TAKE 1 TABLET(10 MG) BY MOUTH EVERY NIGHT   • L

## 2019-11-08 RX ORDER — LOSARTAN POTASSIUM 25 MG/1
TABLET ORAL
Qty: 90 TABLET | Refills: 1 | Status: SHIPPED | OUTPATIENT
Start: 2019-11-08 | End: 2020-05-04

## 2019-11-08 NOTE — TELEPHONE ENCOUNTER
Passed protocol     Last refill:  4/4/2019 Losartan 25 mg #90 1R    LOV:   6/24/2019 Dr Alex Cardona RTC 6 months  FOV scheduled 11/12/2019

## 2019-11-12 ENCOUNTER — OFFICE VISIT (OUTPATIENT)
Dept: INTERNAL MEDICINE CLINIC | Facility: CLINIC | Age: 79
End: 2019-11-12
Payer: MEDICARE

## 2019-11-12 ENCOUNTER — APPOINTMENT (OUTPATIENT)
Dept: LAB | Age: 79
End: 2019-11-12
Attending: INTERNAL MEDICINE
Payer: MEDICARE

## 2019-11-12 VITALS
WEIGHT: 173.25 LBS | HEIGHT: 57 IN | BODY MASS INDEX: 37.37 KG/M2 | DIASTOLIC BLOOD PRESSURE: 88 MMHG | HEART RATE: 103 BPM | OXYGEN SATURATION: 97 % | RESPIRATION RATE: 18 BRPM | TEMPERATURE: 98 F | SYSTOLIC BLOOD PRESSURE: 134 MMHG

## 2019-11-12 DIAGNOSIS — E11.65 TYPE 2 DIABETES MELLITUS WITH HYPERGLYCEMIA, WITHOUT LONG-TERM CURRENT USE OF INSULIN (HCC): Chronic | ICD-10-CM

## 2019-11-12 DIAGNOSIS — M10.9 GOUT OF FOOT, UNSPECIFIED CAUSE, UNSPECIFIED CHRONICITY, UNSPECIFIED LATERALITY: Chronic | ICD-10-CM

## 2019-11-12 DIAGNOSIS — E03.4 HYPOTHYROIDISM DUE TO ACQUIRED ATROPHY OF THYROID: Chronic | ICD-10-CM

## 2019-11-12 DIAGNOSIS — Z00.00 ROUTINE GENERAL MEDICAL EXAMINATION AT A HEALTH CARE FACILITY: Primary | ICD-10-CM

## 2019-11-12 PROBLEM — Z47.89 AFTERCARE FOLLOWING SURGERY OF THE MUSCULOSKELETAL SYSTEM, NEC: Status: RESOLVED | Noted: 2019-06-28 | Resolved: 2019-11-12

## 2019-11-12 PROBLEM — M65.341 TRIGGER RING FINGER OF RIGHT HAND: Chronic | Status: ACTIVE | Noted: 2018-04-16

## 2019-11-12 PROCEDURE — 36415 COLL VENOUS BLD VENIPUNCTURE: CPT | Performed by: INTERNAL MEDICINE

## 2019-11-12 PROCEDURE — 83036 HEMOGLOBIN GLYCOSYLATED A1C: CPT | Performed by: INTERNAL MEDICINE

## 2019-11-12 PROCEDURE — 84443 ASSAY THYROID STIM HORMONE: CPT | Performed by: INTERNAL MEDICINE

## 2019-11-12 PROCEDURE — 80048 BASIC METABOLIC PNL TOTAL CA: CPT | Performed by: INTERNAL MEDICINE

## 2019-11-12 PROCEDURE — 82043 UR ALBUMIN QUANTITATIVE: CPT | Performed by: INTERNAL MEDICINE

## 2019-11-12 PROCEDURE — 85025 COMPLETE CBC W/AUTO DIFF WBC: CPT | Performed by: INTERNAL MEDICINE

## 2019-11-12 PROCEDURE — 84550 ASSAY OF BLOOD/URIC ACID: CPT | Performed by: INTERNAL MEDICINE

## 2019-11-12 PROCEDURE — G0439 PPPS, SUBSEQ VISIT: HCPCS | Performed by: INTERNAL MEDICINE

## 2019-11-12 PROCEDURE — 82570 ASSAY OF URINE CREATININE: CPT | Performed by: INTERNAL MEDICINE

## 2019-11-12 PROCEDURE — 81001 URINALYSIS AUTO W/SCOPE: CPT | Performed by: INTERNAL MEDICINE

## 2019-11-12 PROCEDURE — 80061 LIPID PANEL: CPT | Performed by: INTERNAL MEDICINE

## 2019-11-12 PROCEDURE — 84436 ASSAY OF TOTAL THYROXINE: CPT | Performed by: INTERNAL MEDICINE

## 2019-11-12 RX ORDER — SULFAMETHOXAZOLE AND TRIMETHOPRIM 800; 160 MG/1; MG/1
1 TABLET ORAL 2 TIMES DAILY
Refills: 0 | COMMUNITY
Start: 2019-06-24 | End: 2019-11-12 | Stop reason: ALTCHOICE

## 2019-11-12 NOTE — PROGRESS NOTES
Regino Romo  1940 is a [de-identified]year old female.     Patient presents with:  Physical      HPI:   Does not  Use cpap   Current Outpatient Medications   Medication Sig Dispense Refill   • LOSARTAN POTASSIUM 25 MG Oral Tab TAKE 1 TABLET(25 MG) BY MOUTH EVARISTO REVIEW OF SYSTEMS:     General/Constitutional:   General able to do usual activities, good exercise tolerance, good general state of health, no fatigue, no fever, no weakness, no weight loss or gain .    HEENT/Neck:   Head no headache, no dizziness, no fainting, loss of consciousness, weakness. Memory loss none. Tingling/numbness none. Trouble with balance none. Psychiatric:   Patient denies depression, hallucinations, memory loss. Anxiety none. Insomnia none.                 EXAM:   /88   Pulse 1 grossly intact. Gait: normal.   Mental Status: Alert & oriented x 3. Motor: power-normal/tone -normal/co-ordination normal/wasting -none/involuntary movements -none. Reflexes: normal.   Sensory: normal sensation to all modalities.    LYMPHATICS:   [place]

## 2019-11-16 ENCOUNTER — TELEPHONE (OUTPATIENT)
Dept: INTERNAL MEDICINE CLINIC | Facility: CLINIC | Age: 79
End: 2019-11-16

## 2019-11-22 ENCOUNTER — OFFICE VISIT (OUTPATIENT)
Dept: INTERNAL MEDICINE CLINIC | Facility: CLINIC | Age: 79
End: 2019-11-22
Payer: MEDICARE

## 2019-11-22 ENCOUNTER — APPOINTMENT (OUTPATIENT)
Dept: LAB | Age: 79
End: 2019-11-22
Attending: INTERNAL MEDICINE
Payer: MEDICARE

## 2019-11-22 VITALS
OXYGEN SATURATION: 97 % | TEMPERATURE: 98 F | BODY MASS INDEX: 33 KG/M2 | DIASTOLIC BLOOD PRESSURE: 70 MMHG | SYSTOLIC BLOOD PRESSURE: 132 MMHG | RESPIRATION RATE: 16 BRPM | WEIGHT: 152 LBS | HEART RATE: 64 BPM

## 2019-11-22 DIAGNOSIS — D64.9 ANEMIA, UNSPECIFIED TYPE: ICD-10-CM

## 2019-11-22 DIAGNOSIS — M19.011 PRIMARY OSTEOARTHRITIS OF RIGHT SHOULDER: Primary | ICD-10-CM

## 2019-11-22 PROCEDURE — 82746 ASSAY OF FOLIC ACID SERUM: CPT | Performed by: INTERNAL MEDICINE

## 2019-11-22 PROCEDURE — 99213 OFFICE O/P EST LOW 20 MIN: CPT | Performed by: INTERNAL MEDICINE

## 2019-11-22 PROCEDURE — 85025 COMPLETE CBC W/AUTO DIFF WBC: CPT | Performed by: INTERNAL MEDICINE

## 2019-11-22 PROCEDURE — 82728 ASSAY OF FERRITIN: CPT | Performed by: INTERNAL MEDICINE

## 2019-11-22 PROCEDURE — 36415 COLL VENOUS BLD VENIPUNCTURE: CPT | Performed by: INTERNAL MEDICINE

## 2019-11-22 PROCEDURE — 83540 ASSAY OF IRON: CPT | Performed by: INTERNAL MEDICINE

## 2019-11-22 PROCEDURE — 83550 IRON BINDING TEST: CPT | Performed by: INTERNAL MEDICINE

## 2019-11-22 PROCEDURE — 82607 VITAMIN B-12: CPT | Performed by: INTERNAL MEDICINE

## 2019-11-22 NOTE — PROGRESS NOTES
Hetal Romo  1940 is a [de-identified]year old female. Patient presents with:  Shoulder Pain      HPI:   Shoulder/Upper arm:   Shoulder pain  right  Fall none. Direct trauma none   Previous injury none. Tingling/numbness none. Weakness none.  S  Previ Sx 6/21/19; CRISTINA 9/19/19 6/28/2019   • Rotator cuff (capsule) sprain and strain 6/7/13      Social History:  Social History    Tobacco Use      Smoking status: Never Smoker      Smokeless tobacco: Never Used    Alcohol use: No    Drug use: No       REVIEW O

## 2019-12-09 PROBLEM — M12.811 RIGHT ROTATOR CUFF TEAR ARTHROPATHY: Status: ACTIVE | Noted: 2019-12-09

## 2019-12-09 PROBLEM — M75.101 RIGHT ROTATOR CUFF TEAR ARTHROPATHY: Status: ACTIVE | Noted: 2019-12-09

## 2019-12-29 DIAGNOSIS — E78.00 PURE HYPERCHOLESTEROLEMIA: Chronic | ICD-10-CM

## 2019-12-30 ENCOUNTER — APPOINTMENT (OUTPATIENT)
Dept: LAB | Age: 79
End: 2019-12-30
Attending: INTERNAL MEDICINE
Payer: MEDICARE

## 2019-12-30 ENCOUNTER — OFFICE VISIT (OUTPATIENT)
Dept: INTERNAL MEDICINE CLINIC | Facility: CLINIC | Age: 79
End: 2019-12-30
Payer: MEDICARE

## 2019-12-30 VITALS
OXYGEN SATURATION: 97 % | HEART RATE: 70 BPM | BODY MASS INDEX: 37 KG/M2 | DIASTOLIC BLOOD PRESSURE: 82 MMHG | SYSTOLIC BLOOD PRESSURE: 128 MMHG | RESPIRATION RATE: 16 BRPM | WEIGHT: 169 LBS

## 2019-12-30 DIAGNOSIS — E11.65 TYPE 2 DIABETES MELLITUS WITH HYPERGLYCEMIA, WITHOUT LONG-TERM CURRENT USE OF INSULIN (HCC): ICD-10-CM

## 2019-12-30 DIAGNOSIS — D64.9 ANEMIA, UNSPECIFIED TYPE: Primary | ICD-10-CM

## 2019-12-30 PROCEDURE — 36415 COLL VENOUS BLD VENIPUNCTURE: CPT | Performed by: INTERNAL MEDICINE

## 2019-12-30 PROCEDURE — 85025 COMPLETE CBC W/AUTO DIFF WBC: CPT | Performed by: INTERNAL MEDICINE

## 2019-12-30 PROCEDURE — 99213 OFFICE O/P EST LOW 20 MIN: CPT | Performed by: INTERNAL MEDICINE

## 2019-12-30 NOTE — PROGRESS NOTES
Pierce Romo  1940 is a [de-identified]year old female. Patient presents with:   Follow - Up    The orthopedic recommended conservative therapy    Patient's CBC was not drawn for questionable reason and she did not drop of the occult blood card  HPI:   Brenda López Disorder of bursae and tendons in shoulder region    • Gouty arthropathy 9/27/12   • History of bone density study 6/12/13   • History of EKG 5/14/13   • Hyperuricemia    • Hypothyroid    • Internal hemorrhoids 4/19/10   • OA (osteoarthritis)    • MIGUEL (obs mellitus with hyperglycemia, without long-term current use of insulin [facility])               Patient Instructions   pending lab work prior to further recommendations     The patient indicates understanding of these issues and agrees to the plan.   The patient

## 2019-12-31 RX ORDER — ATORVASTATIN CALCIUM 10 MG/1
TABLET, FILM COATED ORAL
Qty: 90 TABLET | Refills: 0 | Status: SHIPPED | OUTPATIENT
Start: 2019-12-31 | End: 2020-05-18

## 2019-12-31 RX ORDER — LEVOTHYROXINE SODIUM 112 UG/1
TABLET ORAL
Qty: 90 TABLET | Refills: 0 | Status: SHIPPED | OUTPATIENT
Start: 2019-12-31 | End: 2020-05-18

## 2019-12-31 NOTE — TELEPHONE ENCOUNTER
LEVOTHYROXINE SODIUM 112 MCG Oral Tab    Last OV relevant to medication: 12/30/2019    Last refill date: 9- # 90 tabs with 0 refills    When pt was asked to return for OV: 3 months    Upcoming appt/reason: none    Labs:11-    pending lab work

## 2020-02-27 ENCOUNTER — TELEPHONE (OUTPATIENT)
Dept: INTERNAL MEDICINE CLINIC | Facility: CLINIC | Age: 80
End: 2020-02-27

## 2020-02-27 NOTE — TELEPHONE ENCOUNTER
Patient calling because she would like recommendation for doctor for cataract removal; currently a friend recommended Rebecca Bennett but wait is until May 2020 to get an appointment.  She would like to know what Dr Jay Warren recommends/what ophthalmologist. Dave

## 2020-03-04 ENCOUNTER — TELEPHONE (OUTPATIENT)
Dept: INTERNAL MEDICINE CLINIC | Facility: CLINIC | Age: 80
End: 2020-03-04

## 2020-03-04 NOTE — TELEPHONE ENCOUNTER
Pt c/o vomiting and diarrhea the past 2 nights. Pt stated she has not had any symptoms since 5am but has not drank anything. Pt denies fever or abdominal pain. Advised pt to go to UC/ER if unable to keep fluids down and developing diarrhea/vomiting again.

## 2020-03-05 ENCOUNTER — OFFICE VISIT (OUTPATIENT)
Dept: INTERNAL MEDICINE CLINIC | Facility: CLINIC | Age: 80
End: 2020-03-05
Payer: MEDICARE

## 2020-03-05 ENCOUNTER — LAB ENCOUNTER (OUTPATIENT)
Dept: LAB | Age: 80
End: 2020-03-05
Attending: INTERNAL MEDICINE
Payer: MEDICARE

## 2020-03-05 VITALS
HEIGHT: 57 IN | TEMPERATURE: 98 F | SYSTOLIC BLOOD PRESSURE: 124 MMHG | WEIGHT: 162 LBS | RESPIRATION RATE: 18 BRPM | DIASTOLIC BLOOD PRESSURE: 82 MMHG | HEART RATE: 62 BPM | BODY MASS INDEX: 34.95 KG/M2 | OXYGEN SATURATION: 97 %

## 2020-03-05 DIAGNOSIS — K52.9 GASTROENTERITIS: Primary | ICD-10-CM

## 2020-03-05 DIAGNOSIS — K52.9 GASTROENTERITIS: ICD-10-CM

## 2020-03-05 PROCEDURE — 87046 STOOL CULTR AEROBIC BACT EA: CPT

## 2020-03-05 PROCEDURE — 87045 FECES CULTURE AEROBIC BACT: CPT

## 2020-03-05 PROCEDURE — 87427 SHIGA-LIKE TOXIN AG IA: CPT

## 2020-03-05 PROCEDURE — 87493 C DIFF AMPLIFIED PROBE: CPT

## 2020-03-05 PROCEDURE — 99213 OFFICE O/P EST LOW 20 MIN: CPT | Performed by: INTERNAL MEDICINE

## 2020-03-05 NOTE — PROGRESS NOTES
Rebekah Rea [facility] 1940 is a [de-identified]year old female. Patient presents with:  Stool: jelly like  Last 4 days has been having some vomiting which has resolved now has mushy stools.   Patient thought that it is related to her medicine and stopped all her m (osteoarthritis)    • MIGUEL (obstructive sleep apnea)    • Pure hypercholesterolemia    • Removal of foreign body, R foot;  Sx 6/21/19; CRISTINA 9/19/19 6/28/2019   • Rotator cuff (capsule) sprain and strain 6/7/13     Social History    Socioeconomic History Distention no. Heartburn no. Hematemesis no. Hemorrhoids none. Indigestion: none. Nausea no. Weight loss no.      EXAM:     /82   Pulse 62   Temp 98.1 °F (36.7 °C) (Oral)   Resp 18   Ht 57\"   Wt 162 lb (73.5 kg)   SpO2 97%   BMI 35.06 kg/m²   General

## 2020-03-06 LAB — C DIFF TOX B STL QL: NEGATIVE

## 2020-03-09 ENCOUNTER — TELEPHONE (OUTPATIENT)
Dept: INTERNAL MEDICINE CLINIC | Facility: CLINIC | Age: 80
End: 2020-03-09

## 2020-03-09 NOTE — TELEPHONE ENCOUNTER
Patient calling to ask doctor if she should start taking her regular medications now that her \"stools are not like jelly, they are back to normal but she will not begin meds without doctor telling her to. \"

## 2020-03-26 DIAGNOSIS — M10.9 GOUT OF FOOT, UNSPECIFIED CAUSE, UNSPECIFIED CHRONICITY, UNSPECIFIED LATERALITY: Chronic | ICD-10-CM

## 2020-03-26 RX ORDER — ALLOPURINOL 100 MG/1
TABLET ORAL
Qty: 90 TABLET | Refills: 1 | Status: SHIPPED | OUTPATIENT
Start: 2020-03-26 | End: 2021-02-04

## 2020-03-26 NOTE — TELEPHONE ENCOUNTER
ALLOPURINOL 100MG TABLETS    LOV: 3/5/2020  Date Filled: 9/24/2019  #90 with 1 refill   RTC: 1 week   Future appt: no future office visit scheduled

## 2020-05-04 RX ORDER — LOSARTAN POTASSIUM 25 MG/1
TABLET ORAL
Qty: 90 TABLET | Refills: 1 | Status: SHIPPED | OUTPATIENT
Start: 2020-05-04 | End: 2021-02-08

## 2020-05-08 ENCOUNTER — TELEPHONE (OUTPATIENT)
Dept: INTERNAL MEDICINE CLINIC | Facility: CLINIC | Age: 80
End: 2020-05-08

## 2020-05-08 NOTE — TELEPHONE ENCOUNTER
See note in chart regarding Tracey Salguero MD   SURGERY, ORTHOPEDIC previous      Patient calling to speak to Dr Jay Warren regarding this $3000 charge etc.  Advised that doctor does not handle billing but would document her concern.   She states Dr Brian Sheramn

## 2020-05-15 ENCOUNTER — OFFICE VISIT (OUTPATIENT)
Dept: INTERNAL MEDICINE CLINIC | Facility: CLINIC | Age: 80
End: 2020-05-15
Payer: MEDICARE

## 2020-05-15 ENCOUNTER — TELEPHONE (OUTPATIENT)
Dept: INTERNAL MEDICINE CLINIC | Facility: CLINIC | Age: 80
End: 2020-05-15

## 2020-05-15 VITALS
SYSTOLIC BLOOD PRESSURE: 124 MMHG | RESPIRATION RATE: 20 BRPM | DIASTOLIC BLOOD PRESSURE: 70 MMHG | TEMPERATURE: 98 F | BODY MASS INDEX: 34.03 KG/M2 | HEART RATE: 92 BPM | HEIGHT: 57 IN | WEIGHT: 157.75 LBS

## 2020-05-15 DIAGNOSIS — R20.2 TINGLING: Primary | ICD-10-CM

## 2020-05-15 PROCEDURE — 99214 OFFICE O/P EST MOD 30 MIN: CPT | Performed by: INTERNAL MEDICINE

## 2020-05-15 NOTE — TELEPHONE ENCOUNTER
Appointment scheduled    Future Appointments   Date Time Provider [place]   5/15/2020  1:30 PM Tanna Jeans, MD [facility] [place]

## 2020-05-15 NOTE — TELEPHONE ENCOUNTER
Pt reports numbness and tingling to left finger for approximately one week, still has use of hand. Pt is willing to come into office for visit if appropriate.   Call to advise

## 2020-05-15 NOTE — PATIENT INSTRUCTIONS
As with patient the work-up for evaluation of the carotid circulation including echo carotid Doppler scan of the head possible EMG nerve conduction of the extremities.   Patient states in view of her advanced age she is not desirable to proceed with any mayra

## 2020-05-15 NOTE — PROGRESS NOTES
Rebekah Rea [facility] 1940 is a [de-identified]year old female.     Patient presents with:  Numbness      HPI:     Complains of just tingling numbness on the lateral aspect of the still phalanx of the little finger on the left side and the lateral aspect of the fifth d hypercholesterolemia    • Removal of foreign body, R foot;  Sx 6/21/19; CRISTINA 9/19/19 6/28/2019   • Rotator cuff (capsule) sprain and strain 6/7/13      Social History:  Social History    Tobacco Use      Smoking status: Never Smoker      Smokeless tobacco: N normal strength bilaterally. Muscle Bulk: normal .   Plantars: downgoing bilaterally. Reflexes: normal.   Sensory: normal sensation. Tone: normal.       ASSESSMENT AND PLAN:   Salud Murphy was seen today for numbness.     Diagnoses and all orders for this vis

## 2020-05-16 DIAGNOSIS — E78.00 PURE HYPERCHOLESTEROLEMIA: Chronic | ICD-10-CM

## 2020-05-18 RX ORDER — ATORVASTATIN CALCIUM 10 MG/1
TABLET, FILM COATED ORAL
Qty: 90 TABLET | Refills: 0 | Status: SHIPPED | OUTPATIENT
Start: 2020-05-18 | End: 2020-08-17

## 2020-05-18 RX ORDER — LEVOTHYROXINE SODIUM 112 UG/1
TABLET ORAL
Qty: 90 TABLET | Refills: 0 | Status: SHIPPED | OUTPATIENT
Start: 2020-05-18 | End: 2020-08-17

## 2020-08-17 DIAGNOSIS — E78.00 PURE HYPERCHOLESTEROLEMIA: Chronic | ICD-10-CM

## 2020-08-17 RX ORDER — ATORVASTATIN CALCIUM 10 MG/1
TABLET, FILM COATED ORAL
Qty: 90 TABLET | Refills: 0 | Status: SHIPPED | OUTPATIENT
Start: 2020-08-17 | End: 2021-02-05

## 2020-08-17 RX ORDER — LEVOTHYROXINE SODIUM 112 UG/1
TABLET ORAL
Qty: 90 TABLET | Refills: 0 | Status: SHIPPED | OUTPATIENT
Start: 2020-08-17 | End: 2021-02-05

## 2020-08-17 NOTE — TELEPHONE ENCOUNTER
Passed protocol for Levothyroxine  Failed protocol for metformin and atorvastatin - labs needed    Medication(s) to Refill:   Requested Prescriptions     Pending Prescriptions Disp Refills   • LEVOTHYROXINE SODIUM 112 MCG Oral Tab [Pharmacy Med Name: Bhumika Cornelius

## 2020-11-10 NOTE — TELEPHONE ENCOUNTER
Called patient and relayed result and recommendation below, patient verbalized understanding and expressed thanks.    Please see message below and advise. TY!

## 2020-11-29 ENCOUNTER — TELEPHONE (OUTPATIENT)
Dept: INTERNAL MEDICINE CLINIC | Facility: CLINIC | Age: 80
End: 2020-11-29

## 2020-11-29 NOTE — TELEPHONE ENCOUNTER
Patient called and left message, states she will be permanently moving to her daughter's house in [place], [place] on December 18th, will transfer care to physicians in [place].   She wanted to thank Dr. Sean Stoner for all his help throughout the ye

## 2021-02-03 DIAGNOSIS — M10.9 GOUT OF FOOT, UNSPECIFIED CAUSE, UNSPECIFIED CHRONICITY, UNSPECIFIED LATERALITY: Chronic | ICD-10-CM

## 2021-02-04 RX ORDER — LOSARTAN POTASSIUM 25 MG/1
25 TABLET ORAL DAILY
Qty: 90 TABLET | Refills: 1 | OUTPATIENT
Start: 2021-02-04

## 2021-02-04 RX ORDER — ALLOPURINOL 100 MG/1
TABLET ORAL
Qty: 90 TABLET | Refills: 1 | Status: SHIPPED | OUTPATIENT
Start: 2021-02-04

## 2021-02-04 NOTE — TELEPHONE ENCOUNTER
See TE from 11/29/2020     Failed protocol     Last refill:  5/4/2020 Losartan 25 mg #90 1R    LOV:   5/15/2020 Dr Wendy Tineo RTC 3 months  No FOV scheduled

## 2021-02-04 NOTE — TELEPHONE ENCOUNTER
Medication(s) to Refill:   Requested Prescriptions     Pending Prescriptions Disp Refills   • ALLOPURINOL 100 MG Oral Tab [Pharmacy Med Name: ALLOPURINOL 100MG TABLETS] 90 tablet 1     Sig: TAKE 1 TABLET(100 MG) BY MOUTH DAILY       LOV: 5-    RTC:

## 2021-02-05 DIAGNOSIS — E78.00 PURE HYPERCHOLESTEROLEMIA: Chronic | ICD-10-CM

## 2021-02-05 RX ORDER — LOSARTAN POTASSIUM 25 MG/1
25 TABLET ORAL DAILY
Qty: 90 TABLET | Refills: 1 | Status: CANCELLED | OUTPATIENT
Start: 2021-02-05

## 2021-02-06 NOTE — TELEPHONE ENCOUNTER
Failed protocol      Last refill:8-     LOV:   5/15/2020 Dr Holly Mckeon RTC 3 months  No FOV scheduled

## 2021-02-08 RX ORDER — ATORVASTATIN CALCIUM 10 MG/1
10 TABLET, FILM COATED ORAL NIGHTLY
Qty: 90 TABLET | Refills: 0 | Status: SHIPPED | OUTPATIENT
Start: 2021-02-08

## 2021-02-08 RX ORDER — LEVOTHYROXINE SODIUM 112 UG/1
112 TABLET ORAL
Qty: 90 TABLET | Refills: 0 | Status: SHIPPED | OUTPATIENT
Start: 2021-02-08

## 2021-02-09 RX ORDER — LOSARTAN POTASSIUM 25 MG/1
25 TABLET ORAL DAILY
Qty: 90 TABLET | Refills: 0 | Status: SHIPPED | OUTPATIENT
Start: 2021-02-09

## 2021-02-09 NOTE — TELEPHONE ENCOUNTER
Failed protocol - [employer] message sent to pt to contact office to schedule CPX    Requesting LOSARTAN POTASSIUM 25 MG Oral Tab  LOV: 5/15/20  RTC: 3 months  Last Relevant Labs: 11/12/19  Filled: 5/4/20 #90 with 1 refills    No future appointments.

## 2021-02-15 ENCOUNTER — TELEPHONE (OUTPATIENT)
Dept: INTERNAL MEDICINE CLINIC | Facility: CLINIC | Age: 81
End: 2021-02-15

## 2021-02-15 NOTE — TELEPHONE ENCOUNTER
MRR received from Dr Billy Sanches office requesting pts labs, mammograms and immunization records for the last 2 years. Information was faxed to [phone] - fax confirmation received.

## 2021-05-06 DIAGNOSIS — E78.00 PURE HYPERCHOLESTEROLEMIA: Chronic | ICD-10-CM

## 2021-05-06 RX ORDER — LOSARTAN POTASSIUM 25 MG/1
TABLET ORAL
Qty: 90 TABLET | Refills: 0 | OUTPATIENT
Start: 2021-05-06

## 2021-05-06 RX ORDER — ATORVASTATIN CALCIUM 10 MG/1
TABLET, FILM COATED ORAL
Qty: 90 TABLET | Refills: 0 | OUTPATIENT
Start: 2021-05-06

## 2021-07-22 RX ORDER — LOSARTAN POTASSIUM 25 MG/1
TABLET ORAL
Qty: 90 TABLET | Refills: 0 | OUTPATIENT
Start: 2021-07-22

## 2021-07-22 NOTE — TELEPHONE ENCOUNTER
Lm for pt to return call. Pt is overdue for Px and lab work. Pt has not been seen since 05/15/2020, by Dr. Kyle Pearson.     Losartan 25 mg failed protocol due to  Hypertension Medications Protocol [id number]/2021 01:19 PM   CMP or BMP in past 12 months Armando

## 2021-11-02 DIAGNOSIS — M10.9 GOUT OF FOOT, UNSPECIFIED CAUSE, UNSPECIFIED CHRONICITY, UNSPECIFIED LATERALITY: Chronic | ICD-10-CM

## 2021-11-03 RX ORDER — ALLOPURINOL 100 MG/1
TABLET ORAL
Qty: 90 TABLET | Refills: 1 | OUTPATIENT
Start: 2021-11-03

## 2021-11-03 NOTE — TELEPHONE ENCOUNTER
Pt needs to be seen in office for AWV and lab work. Allopurinol 100 mg  Filled 2-4-21  Qty 90  1 refill  No upcoming appt.    LOV 05-15-20

## 2021-11-17 DIAGNOSIS — M10.9 GOUT OF FOOT, UNSPECIFIED CAUSE, UNSPECIFIED CHRONICITY, UNSPECIFIED LATERALITY: Chronic | ICD-10-CM

## 2021-11-18 RX ORDER — ALLOPURINOL 100 MG/1
TABLET ORAL
Qty: 90 TABLET | Refills: 1 | OUTPATIENT
Start: 2021-11-18

## 2021-11-18 NOTE — TELEPHONE ENCOUNTER
No Protocol     Requesting: allopurinol 100mg     LOV: 5/15/20   RTC: 3 months   Filled: 2/4/21 #90 1 refill     Upcoming OV: none scheduled

## 2021-11-22 DIAGNOSIS — M10.9 GOUT OF FOOT, UNSPECIFIED CAUSE, UNSPECIFIED CHRONICITY, UNSPECIFIED LATERALITY: Chronic | ICD-10-CM

## 2021-11-26 RX ORDER — ALLOPURINOL 100 MG/1
TABLET ORAL
Qty: 90 TABLET | Refills: 1 | OUTPATIENT
Start: 2021-11-26

## (undated) NOTE — ED AVS SNAPSHOT
Spencer Granda   MRN: [id number]    Department:  [facility] Emergency Department   Date of Visit:  6/19/2019           Disclosure     Insurance plans vary and the physician(s) referred by the ER may not be covered by your plan.  Please contact your i tell this physician (or your personal doctor if your instructions are to return to your personal doctor) about any new or lasting problems. The primary care or specialist physician will see patients referred from the [facility] Emergency Department.  Wing Rosas

## (undated) NOTE — LETTER
12/23/19        Craige Runner Pagar  [address] [place] [address]      Dear Alexy Guthrie records indicate that you have outstanding lab work and or testing that was ordered for you and has not yet been completed:  Orders Placed This [place]

## (undated) NOTE — MR AVS SNAPSHOT
[place]  [address] [facility] [address]  [address] [facility] [address] [phone]               Thank you for choosing us for your health care visit with Arianne Smith MD.  We are glad to serve you and happy to provide you with this larry MetFORMIN HCl 500 MG Tabs   TAKE 1 TABLET(500 MG) BY MOUTH TWICE DAILY   Commonly known as:  GLUCOPHAGE           Vitamin C 500 MG Tabs   Take 500 mg by mouth daily.    Commonly known as:  VITAMIN C           Vitamin D3 1000 units Caps   Take 1 tabl

## (undated) NOTE — MR AVS SNAPSHOT
[place]  [address] [facility] [address]  [address] [facility] [address] [phone]               Thank you for choosing us for your health care visit with Kala Velásquez MD.  We are glad to serve you and happy to provide you with this larry Losartan Potassium 25 MG Tabs   TAKE 1 TABLET BY MOUTH EVERY DAY   Commonly known as:  COZAAR           MetFORMIN HCl 500 MG Tabs   TAKE 1 TABLET(500 MG) BY MOUTH TWICE DAILY   Commonly known as:  GLUCOPHAGE           predniSONE 5 MG Tabs   10 mg twice a walking, light jogging, cycling, swimming, etc.) for a goal of at least 150 minutes per week. Moderation of alcohol consumption Men: limit to <= 2 drinks* per day. Women and lighter weight persons: limit to <= 1 drink* per day.          Your blood pressu